# Patient Record
Sex: MALE | URBAN - METROPOLITAN AREA
[De-identification: names, ages, dates, MRNs, and addresses within clinical notes are randomized per-mention and may not be internally consistent; named-entity substitution may affect disease eponyms.]

---

## 2022-12-08 ENCOUNTER — OFFICE VISIT (OUTPATIENT)
Dept: URBAN - METROPOLITAN AREA CLINIC 30 | Facility: CLINIC | Age: 62
End: 2022-12-08
Payer: COMMERCIAL

## 2022-12-08 DIAGNOSIS — H52.4 PRESBYOPIA: Primary | ICD-10-CM

## 2022-12-08 PROCEDURE — 92004 COMPRE OPH EXAM NEW PT 1/>: CPT | Performed by: OPTOMETRIST

## 2022-12-08 RX ORDER — NEOMYCIN SULFATE, POLYMYXIN B SULFATE AND DEXAMETHASONE 3.5; 10000; 1 MG/ML; [USP'U]/ML; MG/ML
SUSPENSION OPHTHALMIC
Qty: 5 | Refills: 0 | Status: ACTIVE
Start: 2022-12-08

## 2022-12-08 ASSESSMENT — VISUAL ACUITY
OS: 20/20
OD: 20/20

## 2022-12-08 ASSESSMENT — KERATOMETRY
OD: 43.72
OS: 44.06

## 2022-12-08 ASSESSMENT — INTRAOCULAR PRESSURE
OS: 21
OD: 20

## 2022-12-22 ENCOUNTER — OFFICE VISIT (OUTPATIENT)
Dept: URBAN - METROPOLITAN AREA CLINIC 30 | Facility: CLINIC | Age: 62
End: 2022-12-22
Payer: MEDICARE

## 2022-12-22 DIAGNOSIS — H16.9 KERATITIS: Primary | ICD-10-CM

## 2022-12-22 DIAGNOSIS — H25.813 COMBINED FORMS OF AGE-RELATED CATARACT, BILATERAL: ICD-10-CM

## 2022-12-22 DIAGNOSIS — H50.05 ALTERNATING ESOTROPIA: ICD-10-CM

## 2022-12-22 PROCEDURE — 99213 OFFICE O/P EST LOW 20 MIN: CPT | Performed by: OPTOMETRIST

## 2022-12-22 ASSESSMENT — INTRAOCULAR PRESSURE
OD: 22
OS: 20

## 2022-12-22 NOTE — IMPRESSION/PLAN
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 Plan: on CT 12/2022. Longstanding per pt history. Pt notes history of trial c prism glasses noting he could not adapt.

## 2022-12-22 NOTE — IMPRESSION/PLAN
Impression: Keratitis: H16.9. Plan: Peripheral infiltrates OD noted at 12/8/22 exam. Maxitrol Rx'd, patient used BID OD most days, not QID OD as instructed. Appear resolved on exam today. Rec regular lid cleansing c lid specific wipes/cleanser. Pt instructed to contact office if recurs.

## 2022-12-22 NOTE — IMPRESSION/PLAN
Impression: Combined forms of age-related cataract, bilateral: H25.813. Plan: Trace. Tx not indicated. Monitor.